# Patient Record
Sex: FEMALE | URBAN - METROPOLITAN AREA
[De-identification: names, ages, dates, MRNs, and addresses within clinical notes are randomized per-mention and may not be internally consistent; named-entity substitution may affect disease eponyms.]

---

## 2024-06-06 ENCOUNTER — APPOINTMENT (RX ONLY)
Dept: URBAN - METROPOLITAN AREA CLINIC 236 | Facility: CLINIC | Age: 69
Setting detail: DERMATOLOGY
End: 2024-06-06

## 2024-06-06 DIAGNOSIS — L81.4 OTHER MELANIN HYPERPIGMENTATION: ICD-10-CM | Status: INADEQUATELY CONTROLLED

## 2024-06-06 DIAGNOSIS — L71.8 OTHER ROSACEA: ICD-10-CM | Status: STABLE

## 2024-06-06 DIAGNOSIS — L73.8 OTHER SPECIFIED FOLLICULAR DISORDERS: ICD-10-CM | Status: INADEQUATELY CONTROLLED

## 2024-06-06 PROCEDURE — ? PRESCRIPTION

## 2024-06-06 PROCEDURE — ? ELECTRODESICCATION

## 2024-06-06 PROCEDURE — 17110 DESTRUCTION B9 LES UP TO 14: CPT

## 2024-06-06 PROCEDURE — ? MDM - TREATMENT GOALS

## 2024-06-06 PROCEDURE — 99203 OFFICE O/P NEW LOW 30 MIN: CPT | Mod: 25

## 2024-06-06 PROCEDURE — ? PRESCRIPTION MEDICATION MANAGEMENT

## 2024-06-06 PROCEDURE — ? COUNSELING

## 2024-06-06 RX ORDER — HYDROQUINONE 40 MG/G
CREAM TOPICAL
Qty: 28.4 | Refills: 0 | Status: ERX | COMMUNITY
Start: 2024-06-06

## 2024-06-06 RX ADMIN — HYDROQUINONE: 40 CREAM TOPICAL at 00:00

## 2024-06-06 ASSESSMENT — LOCATION DETAILED DESCRIPTION DERM
LOCATION DETAILED: LEFT SUPERIOR CENTRAL MALAR CHEEK
LOCATION DETAILED: RIGHT MEDIAL MALAR CHEEK
LOCATION DETAILED: LEFT MID TEMPLE
LOCATION DETAILED: LEFT INFERIOR MEDIAL MALAR CHEEK
LOCATION DETAILED: RIGHT CHIN

## 2024-06-06 ASSESSMENT — LOCATION SIMPLE DESCRIPTION DERM
LOCATION SIMPLE: CHIN
LOCATION SIMPLE: LEFT TEMPLE
LOCATION SIMPLE: RIGHT CHEEK
LOCATION SIMPLE: LEFT CHEEK

## 2024-06-06 ASSESSMENT — LOCATION ZONE DERM: LOCATION ZONE: FACE

## 2024-06-06 NOTE — PROCEDURE: ELECTRODESICCATION
Medical Necessity Clause: This procedure was medically necessary because the lesions that were treated were:
Medical Necessity Information: It is in your best interest to select a reason for this procedure from the list below. All of these items fulfill various CMS LCD requirements except the new and changing color options.
Consent: The patient's consent was obtained including but not limited to risks of crusting, scabbing, blistering, scarring, darker or lighter pigmentary change, recurrence, incomplete removal and infection.
Include Z78.9 (Other Specified Conditions Influencing Health Status) As An Associated Diagnosis?: No
Detail Level: Simple
Post-Care Instructions: I reviewed with the patient in detail post-care instructions. Patient is to wear sunprotection, and avoid picking at any of the treated lesions. Pt may apply Vaseline to crusted or scabbing areas
Anesthesia Type: 1% lidocaine with epinephrine

## 2024-10-10 ENCOUNTER — APPOINTMENT (RX ONLY)
Dept: URBAN - METROPOLITAN AREA CLINIC 236 | Facility: CLINIC | Age: 69
Setting detail: DERMATOLOGY
End: 2024-10-10

## 2024-10-10 DIAGNOSIS — L71.8 OTHER ROSACEA: ICD-10-CM | Status: INADEQUATELY CONTROLLED

## 2024-10-10 DIAGNOSIS — L73.8 OTHER SPECIFIED FOLLICULAR DISORDERS: ICD-10-CM | Status: INADEQUATELY CONTROLLED

## 2024-10-10 PROCEDURE — ? PRESCRIPTION MEDICATION MANAGEMENT

## 2024-10-10 PROCEDURE — 17110 DESTRUCTION B9 LES UP TO 14: CPT

## 2024-10-10 PROCEDURE — ? ELECTRODESICCATION

## 2024-10-10 PROCEDURE — 99213 OFFICE O/P EST LOW 20 MIN: CPT | Mod: 25

## 2024-10-10 PROCEDURE — ? PRESCRIPTION

## 2024-10-10 PROCEDURE — ? COUNSELING

## 2024-10-10 RX ORDER — PHARMACY COMPOUNDING ACCESSORY
EACH MISCELLANEOUS
Qty: 60 | Refills: 1 | Status: ERX | COMMUNITY
Start: 2024-10-10

## 2024-10-10 RX ADMIN — Medication: at 00:00

## 2024-10-10 ASSESSMENT — LOCATION DETAILED DESCRIPTION DERM
LOCATION DETAILED: RIGHT INFERIOR MEDIAL MALAR CHEEK
LOCATION DETAILED: LEFT INFERIOR CENTRAL MALAR CHEEK
LOCATION DETAILED: LEFT SUPERIOR LATERAL MALAR CHEEK

## 2024-10-10 ASSESSMENT — LOCATION ZONE DERM: LOCATION ZONE: FACE

## 2024-10-10 ASSESSMENT — LOCATION SIMPLE DESCRIPTION DERM
LOCATION SIMPLE: RIGHT CHEEK
LOCATION SIMPLE: LEFT CHEEK

## 2024-10-10 NOTE — PROCEDURE: PRESCRIPTION MEDICATION MANAGEMENT
Initiate Treatment: pharmacy compounding accessory \\nQuantity: 60.0 g  Days Supply: 30\\nSig: Apply twice daily to face 1% h.c 2% Sal acid 3% ppt sulfur on face
Render In Strict Bullet Format?: No
Detail Level: Zone

## 2024-10-10 NOTE — PROCEDURE: ELECTRODESICCATION
Anesthesia Type: 1% lidocaine with epinephrine
Medical Necessity Information: It is in your best interest to select a reason for this procedure from the list below. All of these items fulfill various CMS LCD requirements except the new and changing color options.
Medical Necessity Clause: This procedure was medically necessary because the lesions that were treated were:
Include Z78.9 (Other Specified Conditions Influencing Health Status) As An Associated Diagnosis?: No
Post-Care Instructions: I reviewed with the patient in detail post-care instructions. Patient is to wear sunprotection, and avoid picking at any of the treated lesions. Pt may apply Vaseline to crusted or scabbing areas
Consent: The patient's consent was obtained including but not limited to risks of crusting, scabbing, blistering, scarring, darker or lighter pigmentary change, recurrence, incomplete removal and infection.
Detail Level: Simple